# Patient Record
Sex: MALE | Race: WHITE | NOT HISPANIC OR LATINO
[De-identification: names, ages, dates, MRNs, and addresses within clinical notes are randomized per-mention and may not be internally consistent; named-entity substitution may affect disease eponyms.]

---

## 2017-06-04 ENCOUNTER — FORM ENCOUNTER (OUTPATIENT)
Age: 47
End: 2017-06-04

## 2017-06-09 ENCOUNTER — FORM ENCOUNTER (OUTPATIENT)
Age: 47
End: 2017-06-09

## 2017-06-12 ENCOUNTER — FORM ENCOUNTER (OUTPATIENT)
Age: 47
End: 2017-06-12

## 2017-06-28 ENCOUNTER — FORM ENCOUNTER (OUTPATIENT)
Age: 47
End: 2017-06-28

## 2017-07-02 ENCOUNTER — FORM ENCOUNTER (OUTPATIENT)
Age: 47
End: 2017-07-02

## 2017-07-15 ENCOUNTER — FORM ENCOUNTER (OUTPATIENT)
Age: 47
End: 2017-07-15

## 2017-07-19 ENCOUNTER — FORM ENCOUNTER (OUTPATIENT)
Age: 47
End: 2017-07-19

## 2020-05-04 ENCOUNTER — FORM ENCOUNTER (OUTPATIENT)
Age: 50
End: 2020-05-04

## 2020-05-11 ENCOUNTER — FORM ENCOUNTER (OUTPATIENT)
Age: 50
End: 2020-05-11

## 2020-06-16 ENCOUNTER — FORM ENCOUNTER (OUTPATIENT)
Age: 50
End: 2020-06-16

## 2020-08-09 ENCOUNTER — FORM ENCOUNTER (OUTPATIENT)
Age: 50
End: 2020-08-09

## 2020-11-26 ENCOUNTER — FORM ENCOUNTER (OUTPATIENT)
Age: 50
End: 2020-11-26

## 2020-12-01 ENCOUNTER — FORM ENCOUNTER (OUTPATIENT)
Age: 50
End: 2020-12-01

## 2023-01-20 ENCOUNTER — APPOINTMENT (OUTPATIENT)
Dept: PODIATRY | Facility: CLINIC | Age: 53
End: 2023-01-20
Payer: COMMERCIAL

## 2023-01-20 DIAGNOSIS — L60.3 NAIL DYSTROPHY: ICD-10-CM

## 2023-01-20 DIAGNOSIS — S91.301A UNSPECIFIED OPEN WOUND, RIGHT FOOT, INITIAL ENCOUNTER: ICD-10-CM

## 2023-01-20 PROCEDURE — 11719 TRIM NAIL(S) ANY NUMBER: CPT | Mod: 59

## 2023-01-20 PROCEDURE — 99203 OFFICE O/P NEW LOW 30 MIN: CPT | Mod: 25

## 2023-01-20 PROCEDURE — 11056 PARNG/CUTG B9 HYPRKR LES 2-4: CPT

## 2023-01-20 RX ORDER — MUPIROCIN 20 MG/G
2 OINTMENT TOPICAL
Qty: 1 | Refills: 3 | Status: ACTIVE | COMMUNITY
Start: 2023-01-20 | End: 1900-01-01

## 2023-02-01 PROBLEM — L60.3 NAIL DYSTROPHY: Status: ACTIVE | Noted: 2023-01-26

## 2023-02-02 NOTE — PHYSICAL EXAM
[2+] : left foot dorsalis pedis 2+ [Diminished Throughout Right Foot] : diminished sensation with monofilament testing throughout right foot [Diminished Throughout Left Foot] : diminished sensation with monofilament testing throughout left foot [FreeTextEntry3] : CFT: < 3 seconds. Temperature gradient is warm to cool.  [de-identified] : Hammertoes right 2 through 4, left 2 through 5. Muscle power 5/5 of all pedal groups. 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  [FreeTextEntry1] : Parestheasias bilateral.

## 2023-02-02 NOTE — HISTORY OF PRESENT ILLNESS
[Sneakers] : royer [FreeTextEntry1] : Patient presents today with a complaint of right lateral foot wound that occurred after he peeled off this thickened skin 3 days ago. He noticed bloody drainage which has since then resolved. He denies any redness, purulence, swelling, fever or chills. Patient is diabetic. He has right 5th digit amputation in the past which has healed since then. His finger stick today is 140. A1c is 6.9. Last primary care doctor visit was 6 months ago.\par

## 2023-02-02 NOTE — ASSESSMENT
[FreeTextEntry1] : \par Impression: Diabetes with neuropathy. 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 New right foot superficial wound. Onychodystrophy. IPK's.\par \par Treatment: I discussed etiology and treatment options with the patient for right foot wound. I prescribed the patient Mupirocin. He can continue to use it as he has been using it for dressing changes. Change dressing daily and monitor for any signs of infection and go to the Emergency Room and call the office immediately if they occur, such as purulence, increased drainage, redness, swelling, fever or chills.  Patient states he ambulates minimally. Continue with minimal ambulation until the wound heals. Continue with glycemic control and diabetic foot care.\par For onychodystrophy nails 1 through 5 bilaterally were trimmed with sterile nippers. For IPK's I advised him to continue wearing diabetic foot inserts in his sneakers and IPK's were wiped with alcohol and trimmed with  sterile #23 blade. I will see the patient back in one week for wound check.

## 2023-03-25 ENCOUNTER — APPOINTMENT (OUTPATIENT)
Dept: PODIATRY | Facility: CLINIC | Age: 53
End: 2023-03-25
Payer: COMMERCIAL

## 2023-03-25 DIAGNOSIS — M77.41 METATARSALGIA, RIGHT FOOT: ICD-10-CM

## 2023-03-25 DIAGNOSIS — L85.1 ACQUIRED KERATOSIS [KERATODERMA] PALMARIS ET PLANTARIS: ICD-10-CM

## 2023-03-25 DIAGNOSIS — S98.131A COMPLETE TRAUMATIC AMPUTATION OF ONE RIGHT LESSER TOE, INITIAL ENCOUNTER: ICD-10-CM

## 2023-03-25 PROCEDURE — 11057 PARNG/CUTG B9 HYPRKR LES >4: CPT

## 2023-03-25 PROCEDURE — 99212 OFFICE O/P EST SF 10 MIN: CPT | Mod: 25

## 2023-03-25 NOTE — REASON FOR VISIT
[Follow-Up Visit] : a follow-up visit for [Foot/Ankle Ulcer] : foot/ankle ulcer [Onychomycosis] : onychomycosis

## 2023-03-28 PROBLEM — L85.1 KERATODERMA, ACQUIRED: Status: ACTIVE | Noted: 2023-01-26

## 2023-03-28 PROBLEM — S98.131A AMPUTATION OF TOE OF RIGHT FOOT: Status: ACTIVE | Noted: 2023-01-26

## 2023-04-03 PROBLEM — M77.41 METATARSALGIA OF RIGHT FOOT: Status: ACTIVE | Noted: 2023-03-28

## 2023-04-03 NOTE — ASSESSMENT
[FreeTextEntry1] : \par Impression: Diabetes with neuropathy. S/P right 5th digit amputation, fully healed. IPK's. Metatarsalgia.\par \par Treatment: Discussed etiology and treatment options with the patient. He is wearing Caden sneakers. He states he does not have diabetic shoes at home. I believe patient would benefit from a pair of extra-wide, extra-depth diabetic shoes to accommodate the hammertoe deformity and the retrograde pressure caused by them on the metatarsal heads resulting in metatarsalgia and IPK build-up. Patient would also benefit from a custom pair of orthotics with right 5th digit filler and with metatarsal pad to off-load the right forefoot. Patient was given a script for diabetic shoes and a list of participating vendors and script for 's and referral for the orthotist. All IPK's with wiped with alcohol and trimmed with a sterile #23 blade with improvement in pain. Continue glycemic control, diabetic foot care and neuropathic precautions. I will see the patient back in 3 months for routine care or earlier if he has any other problems.

## 2023-04-03 NOTE — PHYSICAL EXAM
[2+] : left foot dorsalis pedis 2+ [Diminished Throughout Right Foot] : diminished sensation with monofilament testing throughout right foot [Diminished Throughout Left Foot] : diminished sensation with monofilament testing throughout left foot [FreeTextEntry3] : CFT: < 3 seconds. Temperature gradient is warm to cool.  [de-identified] : Hammertoes right 2 through 4, left 2 through 5. Muscle power 5/5 of all pedal groups. 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 Right foot pain on palpation met. heads 2 through 4 with no swelling or redness. [FreeTextEntry1] : Parestheasias bilateral.

## 2023-04-03 NOTE — HISTORY OF PRESENT ILLNESS
[Sneakers] : royer [FreeTextEntry1] : Patient presents today for right ball of foot pain. The patient feels like the ulcer has also built up significantly thicker area and is causing him pain. His right lateral foot wound has fully healed since the last visit. Finger stick today is 142. Last A1c is 6.7. Last primary care doctor appointment was 2 weeks ago and endocrinologist appointment was yesterday. He reports no medical changes.

## 2023-09-26 ENCOUNTER — APPOINTMENT (OUTPATIENT)
Dept: PODIATRY | Facility: CLINIC | Age: 53
End: 2023-09-26
Payer: COMMERCIAL

## 2023-09-26 PROCEDURE — 97597 DBRDMT OPN WND 1ST 20 CM/<: CPT

## 2023-09-26 PROCEDURE — 99213 OFFICE O/P EST LOW 20 MIN: CPT | Mod: 25

## 2023-09-26 PROCEDURE — 73630 X-RAY EXAM OF FOOT: CPT | Mod: LT

## 2023-10-07 ENCOUNTER — APPOINTMENT (OUTPATIENT)
Dept: PODIATRY | Facility: CLINIC | Age: 53
End: 2023-10-07
Payer: COMMERCIAL

## 2023-10-07 PROCEDURE — 97597 DBRDMT OPN WND 1ST 20 CM/<: CPT

## 2023-10-28 ENCOUNTER — APPOINTMENT (OUTPATIENT)
Dept: PODIATRY | Facility: CLINIC | Age: 53
End: 2023-10-28

## 2023-12-14 ENCOUNTER — APPOINTMENT (OUTPATIENT)
Dept: PODIATRY | Facility: CLINIC | Age: 53
End: 2023-12-14
Payer: COMMERCIAL

## 2023-12-14 DIAGNOSIS — E11.49 TYPE 2 DIABETES MELLITUS WITH OTHER DIABETIC NEUROLOGICAL COMPLICATION: ICD-10-CM

## 2023-12-14 DIAGNOSIS — E11.621 TYPE 2 DIABETES MELLITUS WITH FOOT ULCER: ICD-10-CM

## 2023-12-14 DIAGNOSIS — L03.116 CELLULITIS OF LEFT LOWER LIMB: ICD-10-CM

## 2023-12-14 DIAGNOSIS — L97.529 TYPE 2 DIABETES MELLITUS WITH FOOT ULCER: ICD-10-CM

## 2023-12-14 PROCEDURE — 99213 OFFICE O/P EST LOW 20 MIN: CPT

## 2023-12-14 RX ORDER — DOXYCYCLINE HYCLATE 100 MG/1
100 TABLET ORAL
Qty: 14 | Refills: 0 | Status: COMPLETED | COMMUNITY
Start: 2023-09-26 | End: 2023-12-14

## 2023-12-14 RX ORDER — INSULIN GLARGINE 100 [IU]/ML
INJECTION, SOLUTION SUBCUTANEOUS
Refills: 0 | Status: ACTIVE | COMMUNITY

## 2023-12-14 RX ORDER — EZETIMIBE 10 MG/1
TABLET ORAL
Refills: 0 | Status: ACTIVE | COMMUNITY

## 2023-12-14 RX ORDER — DOXYCYCLINE HYCLATE 100 MG/1
100 TABLET ORAL
Qty: 14 | Refills: 0 | Status: ACTIVE | COMMUNITY
Start: 2023-12-14 | End: 1900-01-01

## 2023-12-14 RX ORDER — TIRZEPATIDE 7.5 MG/.5ML
INJECTION, SOLUTION SUBCUTANEOUS
Refills: 0 | Status: ACTIVE | COMMUNITY

## 2023-12-14 RX ORDER — FENOFIBRATE 150 MG/1
CAPSULE ORAL
Refills: 0 | Status: ACTIVE | COMMUNITY

## 2023-12-14 RX ORDER — EMPAGLIFLOZIN 25 MG/1
TABLET, FILM COATED ORAL
Refills: 0 | Status: ACTIVE | COMMUNITY

## 2023-12-14 RX ORDER — MUPIROCIN 20 MG/G
2 OINTMENT TOPICAL
Qty: 1 | Refills: 3 | Status: ACTIVE | COMMUNITY
Start: 2023-09-26 | End: 1900-01-01

## 2023-12-14 RX ORDER — LOSARTAN POTASSIUM 100 MG/1
TABLET, FILM COATED ORAL
Refills: 0 | Status: ACTIVE | COMMUNITY

## 2023-12-14 RX ORDER — METFORMIN HYDROCHLORIDE 625 MG/1
TABLET ORAL
Refills: 0 | Status: ACTIVE | COMMUNITY

## 2023-12-15 PROBLEM — E11.621 DIABETIC ULCER OF LEFT FOOT: Status: ACTIVE | Noted: 2023-09-28

## 2023-12-15 PROBLEM — E11.49 DM (DIABETES MELLITUS), TYPE 2 WITH NEUROLOGICAL COMPLICATIONS: Status: ACTIVE | Noted: 2023-01-26

## 2023-12-15 NOTE — REASON FOR VISIT
[Follow-Up Visit] : a follow-up visit for [Foot Pain] : foot pain [Foot/Ankle Ulcer] : foot/ankle ulcer

## 2023-12-19 RX ORDER — PEN NEEDLE, DIABETIC 29 G X1/2"
31G X 5 MM NEEDLE, DISPOSABLE MISCELLANEOUS
Qty: 100 | Refills: 0 | Status: ACTIVE | COMMUNITY
Start: 2022-12-20

## 2023-12-19 RX ORDER — INSULIN GLARGINE 100 [IU]/ML
100 INJECTION, SOLUTION SUBCUTANEOUS
Qty: 15 | Refills: 0 | Status: ACTIVE | COMMUNITY
Start: 2023-02-08

## 2023-12-19 NOTE — HISTORY OF PRESENT ILLNESS
[Sneakers] : royer [FreeTextEntry1] : Patient returns today for follow up of left submet. 2 ulceration as well as a new ulceration on the left 3rd digit that started approximately 3 days ago, when he wore a pair of dress shoes for an event and was on his feet for 6 hours.  Yesterday, he noticed that the left 5th toe has turned red.  He denies any purulent drainage, malodor, fevers or chills.   He has obtained custom foot orthoses for the left foot, submet. 2 ulcer and has been wearing them in his sneakers.  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   Fingersticks have been in the low 100's in the mornings from 120 - 150.  Last A1c was 6.7%. when he saw his PCP in July.

## 2023-12-19 NOTE — PHYSICAL EXAM
[2+] : left foot dorsalis pedis 2+ [Diminished Throughout Right Foot] : diminished sensation with monofilament testing throughout right foot [Diminished Throughout Left Foot] : diminished sensation with monofilament testing throughout left foot [Ankle Swelling (On Exam)] : not present [Varicose Veins Of Lower Extremities] : not present [FreeTextEntry3] : CFT: < 3 seconds x 10.  Temperature gradient: warm to cool.  [de-identified] : Hammertoes right 2 through 4, left 2 through 5.  Muscle power: 5/5, all pedal groups.  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  [FreeTextEntry1] : Parestheasias bilateral.

## 2024-04-14 NOTE — ASSESSMENT
[FreeTextEntry1] : Impression: Diabetes with neuropathy (E11.42).  Diabetic ulcer, left foot, submet. 2 (E11.621), not infected.  Cellulitis, left 5th digit (L03.116).  Treatment: Discussed etiology and treatment options.  Advised patient to avoid wearing ill-fitted shoe gear.  Submet. 2 off-loading felt pad was added into his .  Continue to wear shoe gear with wide/tall toebox.   Hyperkeratotic skin was shaved around the submet. 2 lesion.  Silvadene dressing was applied.   Prescribed oral Doxycycline for one week for the left 5th toe cellulitis and Mupirocin to be applied to both wounds.  With any worsening changes such as fevers, chills, increased redness, color changes, drainage, malodor, patient it to go to the emergency room immediately.  Continue strict glycemic control.   Return: 1 week.   No